# Patient Record
Sex: FEMALE | Race: WHITE | Employment: FULL TIME | URBAN - METROPOLITAN AREA
[De-identification: names, ages, dates, MRNs, and addresses within clinical notes are randomized per-mention and may not be internally consistent; named-entity substitution may affect disease eponyms.]

---

## 2021-10-26 ENCOUNTER — APPOINTMENT (OUTPATIENT)
Dept: HEMATOLOGY/ONCOLOGY | Facility: HOSPITAL | Age: 56
End: 2021-10-26
Attending: INTERNAL MEDICINE
Payer: COMMERCIAL

## 2021-10-26 VITALS
OXYGEN SATURATION: 97 % | HEIGHT: 68 IN | DIASTOLIC BLOOD PRESSURE: 78 MMHG | SYSTOLIC BLOOD PRESSURE: 135 MMHG | BODY MASS INDEX: 41.22 KG/M2 | WEIGHT: 272 LBS | HEART RATE: 80 BPM | RESPIRATION RATE: 18 BRPM | TEMPERATURE: 100 F

## 2021-10-26 DIAGNOSIS — F32.A ANXIETY AND DEPRESSION: ICD-10-CM

## 2021-10-26 DIAGNOSIS — Z86.16 HISTORY OF COVID-19: ICD-10-CM

## 2021-10-26 DIAGNOSIS — I10 PRIMARY HYPERTENSION: ICD-10-CM

## 2021-10-26 DIAGNOSIS — C73 PAPILLARY THYROID CARCINOMA (HCC): Primary | ICD-10-CM

## 2021-10-26 DIAGNOSIS — F41.9 ANXIETY AND DEPRESSION: ICD-10-CM

## 2021-10-26 DIAGNOSIS — E11.9 TYPE 2 DIABETES MELLITUS WITHOUT COMPLICATION, WITHOUT LONG-TERM CURRENT USE OF INSULIN (HCC): ICD-10-CM

## 2021-10-26 PROCEDURE — 99215 OFFICE O/P EST HI 40 MIN: CPT | Performed by: INTERNAL MEDICINE

## 2021-10-26 NOTE — CONSULTS
HPI   10/26/2021  Kaley Wylie is a 61year old female with a history of pre-diabetes, depression, osteoarthritis, and hypertension. Dr. Larisa Cho ordered a CT soft tissue neck and CT chest 10/17/2021.   The CT neck revealed a large mass centere and vomiting. Genitourinary: Negative for dysuria and hematuria. Musculoskeletal:        Hips arthritis   Bilateral knee replacement surgeries 2018    Skin: Negative for rash. Dry skin    Neurological: Positive for headaches.    Hematological: Br mg total) by mouth daily.  90 tablet 1     Allergies:     Aspirin                 SHORTNESS OF BREATH    Past Medical History:   Diagnosis Date   • Anxiety and depression 10/30/2021   • Depression    • History of COVID-19 10/30/2021   • Hypertension    • Os Not on file      Attends Club or Organization Meetings: Not on file      Marital Status: Not on file  Intimate Partner Violence:       Fear of Current or Ex-Partner: Not on file      Emotionally Abused: Not on file      Physically Abused: Not on file      There is no abdominal tenderness. Musculoskeletal:      Comments: Bilateral TKA   Lymphadenopathy:      Cervical: Cervical adenopathy present. Skin:     General: Skin is warm and dry. Neurological:      Mental Status: She is alert.    Psychiatric: dimension of 9   mm (series 2.1 image 80). The mass is inseparable from the adjacent strap muscles anteriorly. There   appears to be a preserved fat plane with the adjacent right common carotid artery.  Multiple   pathologically enlarged, metastatic cervica Right thyroid mass corresponding to sonographic findings of 10/17/2021 which needs correlation with follow-up ultrasound-guided fine-needle aspiration (FNA), if clinically indicated. 2. Possible right supraclavicular lymphadenopathy.    3. Multiple pulmon

## 2021-10-29 PROBLEM — I71.2 ASCENDING AORTIC ANEURYSM (HCC): Status: ACTIVE | Noted: 2021-10-29

## 2021-10-30 ENCOUNTER — TELEPHONE (OUTPATIENT)
Dept: HEMATOLOGY/ONCOLOGY | Facility: HOSPITAL | Age: 56
End: 2021-10-30

## 2021-10-30 PROBLEM — E11.9 TYPE 2 DIABETES MELLITUS (HCC): Status: ACTIVE | Noted: 2021-10-30

## 2021-10-30 PROBLEM — F32.A ANXIETY AND DEPRESSION: Status: ACTIVE | Noted: 2021-10-30

## 2021-10-30 PROBLEM — I10 PRIMARY HYPERTENSION: Status: ACTIVE | Noted: 2021-10-30

## 2021-10-30 PROBLEM — Z86.16 HISTORY OF COVID-19: Status: ACTIVE | Noted: 2021-10-30

## 2021-10-30 PROBLEM — C73 PAPILLARY THYROID CARCINOMA (HCC): Status: ACTIVE | Noted: 2021-10-30

## 2021-10-30 PROBLEM — F41.9 ANXIETY AND DEPRESSION: Status: ACTIVE | Noted: 2021-10-30

## 2021-10-30 NOTE — TELEPHONE ENCOUNTER
Called to patient's home phone number and reached her  who indicated that she is working today and would be home if I called back around 5:00. Pathology confirmed papillary thyroid cancer.

## 2021-11-02 ENCOUNTER — OFFICE VISIT (OUTPATIENT)
Dept: HEMATOLOGY/ONCOLOGY | Facility: HOSPITAL | Age: 56
End: 2021-11-02
Attending: INTERNAL MEDICINE
Payer: COMMERCIAL

## 2021-11-02 ENCOUNTER — TELEPHONE (OUTPATIENT)
Dept: HEMATOLOGY/ONCOLOGY | Facility: HOSPITAL | Age: 56
End: 2021-11-02

## 2021-11-02 VITALS
DIASTOLIC BLOOD PRESSURE: 83 MMHG | OXYGEN SATURATION: 96 % | BODY MASS INDEX: 41.68 KG/M2 | HEART RATE: 79 BPM | TEMPERATURE: 99 F | RESPIRATION RATE: 18 BRPM | WEIGHT: 275 LBS | HEIGHT: 68 IN | SYSTOLIC BLOOD PRESSURE: 130 MMHG

## 2021-11-02 DIAGNOSIS — E11.9 TYPE 2 DIABETES MELLITUS WITHOUT COMPLICATION, WITHOUT LONG-TERM CURRENT USE OF INSULIN (HCC): ICD-10-CM

## 2021-11-02 DIAGNOSIS — C73 PAPILLARY THYROID CARCINOMA (HCC): Primary | ICD-10-CM

## 2021-11-02 DIAGNOSIS — Z86.16 HISTORY OF COVID-19: ICD-10-CM

## 2021-11-02 DIAGNOSIS — F41.9 ANXIETY AND DEPRESSION: ICD-10-CM

## 2021-11-02 DIAGNOSIS — F32.A ANXIETY AND DEPRESSION: ICD-10-CM

## 2021-11-02 PROCEDURE — 99214 OFFICE O/P EST MOD 30 MIN: CPT | Performed by: INTERNAL MEDICINE

## 2021-11-02 NOTE — TELEPHONE ENCOUNTER
Called and spoke with Dedrick Long at [facility]- Dr. Shira Diallo office.   Faxed over records- Spoke with Neeru Solitario and asked her to register and call [phone] to make and appointment - after appointment is made she is to call Dedrick Long [phone] and she will contact the

## 2021-11-02 NOTE — PROGRESS NOTES
HPI   11/2/2021  Wilfrid Adair is a 61year old female with a history of pre-diabetes, depression, asthma, osteoarthritis, Covid November 2020, and hypertension. Patient was contacted by phone 10/30/2021.       Rogelio has ongoing cough and sputum pr 13.64, WBC 3.4 hemoglobin 13.4 platelet count 681 glucose 122 BUN 10 creatinine 0.8 LFTs normal  procalcitonin 0.06. Patient was discharged home.       Review of Systems:     Review of Systems   Constitutional: Positive for appetite change, fatigue L-methylfolate Calcium 15 MG Oral Tab Take 1 tablet (15 mg total) by mouth daily.  90 tablet 3   • AMLODIPINE 2.5 MG Oral Tab TAKE 1 TABLET BY MOUTH EVERY DAY 90 tablet 0   • ergocalciferol 1.25 MG (72410 UT) Oral Cap Take 1 capsule (50,000 Units total) by Never Used    Vaping Use      Vaping Use: Never used    Substance and Sexual Activity      Alcohol use: Yes        Comment: Rarely      Drug use: Never      Sexual activity: Not on file    Other Topics      Concerns:        Not on file    Social History Na (288 lb)  09/29/21 : 129.3 kg (285 lb)  09/02/21 : 133.4 kg (294 lb)  /83 (BP Location: Left arm, Patient Position: Sitting, Cuff Size: large)   Pulse 79   Temp 98.7 °F (37.1 °C) (Oral)   Resp 18   Ht 1.727 m (5' 8\")   Wt 124.7 kg (275 lb)   SpO2 96 cancer with positive involvement of lymph node from the right supraclavicular region. Final immunostains confirm the diagnosis. Patient was seen with her  and they are aware of the final pathology and the need for referral to a thyroid surgeon.   Pankaj Bateman narrowing of the subglottic trachea to a minimum transverse dimension of 4 mm and AP dimension of 9   mm (series 2.1 image 80). The mass is inseparable from the adjacent strap muscles anteriorly.  There   appears to be a preserved fat plane with the adjac see   details above. 10/17/2021 CT chest for evaluation of shortness of breath  1.  Right thyroid mass corresponding to sonographic findings of 10/17/2021 which needs correlation with follow-up ultrasound-guided fine-needle aspiration (FNA), if clinicall

## 2021-11-02 NOTE — TELEPHONE ENCOUNTER
Call from Dr. Saranya Augustin - she was fine with patient seeing Dr Roberto Cm at [facility]

## 2021-11-02 NOTE — TELEPHONE ENCOUNTER
Calls out to   Dr. Dae Valedz [phone] (spokewith Dr. Mario Oden) ,   Dr. Aldo Quiñonez [phone] (could only leave a message)  Dr Beau Morgan